# Patient Record
Sex: MALE | Race: WHITE | Employment: OTHER | URBAN - METROPOLITAN AREA
[De-identification: names, ages, dates, MRNs, and addresses within clinical notes are randomized per-mention and may not be internally consistent; named-entity substitution may affect disease eponyms.]

---

## 2019-12-19 ENCOUNTER — HOSPITAL ENCOUNTER (EMERGENCY)
Facility: CLINIC | Age: 64
Discharge: HOME | End: 2019-12-19
Attending: EMERGENCY MEDICINE | Admitting: EMERGENCY MEDICINE
Payer: COMMERCIAL

## 2019-12-19 VITALS
DIASTOLIC BLOOD PRESSURE: 82 MMHG | SYSTOLIC BLOOD PRESSURE: 147 MMHG | WEIGHT: 208.56 LBS | TEMPERATURE: 97.7 F | RESPIRATION RATE: 18 BRPM | OXYGEN SATURATION: 94 % | HEART RATE: 83 BPM

## 2019-12-19 DIAGNOSIS — I82.4Z2 ACUTE DEEP VEIN THROMBOSIS (DVT) OF DISTAL VEIN OF LEFT LOWER EXTREMITY (H): ICD-10-CM

## 2019-12-19 DIAGNOSIS — I45.10 RBBB: ICD-10-CM

## 2019-12-19 DIAGNOSIS — I26.99 BILATERAL PULMONARY EMBOLISM (H): ICD-10-CM

## 2019-12-19 LAB
ALBUMIN SERPL-MCNC: 3.9 G/DL (ref 3.4–5)
ALP SERPL-CCNC: 57 U/L (ref 40–150)
ALT SERPL W P-5'-P-CCNC: 36 U/L (ref 0–70)
AST SERPL W P-5'-P-CCNC: 23 U/L (ref 0–45)
BILIRUB DIRECT SERPL-MCNC: <0.1 MG/DL (ref 0–0.2)
BILIRUB SERPL-MCNC: 0.2 MG/DL (ref 0.2–1.3)
INTERPRETATION ECG - MUSE: NORMAL
NT-PROBNP SERPL-MCNC: 45 PG/ML (ref 0–900)
PROT SERPL-MCNC: 7.5 G/DL (ref 6.8–8.8)
TROPONIN I SERPL-MCNC: <0.015 UG/L (ref 0–0.04)

## 2019-12-19 PROCEDURE — 93005 ELECTROCARDIOGRAM TRACING: CPT

## 2019-12-19 PROCEDURE — 25000132 ZZH RX MED GY IP 250 OP 250 PS 637: Performed by: EMERGENCY MEDICINE

## 2019-12-19 PROCEDURE — 80076 HEPATIC FUNCTION PANEL: CPT | Performed by: EMERGENCY MEDICINE

## 2019-12-19 PROCEDURE — 99284 EMERGENCY DEPT VISIT MOD MDM: CPT

## 2019-12-19 PROCEDURE — 83880 ASSAY OF NATRIURETIC PEPTIDE: CPT | Performed by: EMERGENCY MEDICINE

## 2019-12-19 PROCEDURE — 84484 ASSAY OF TROPONIN QUANT: CPT | Performed by: EMERGENCY MEDICINE

## 2019-12-19 RX ADMIN — APIXABAN 10 MG: 5 TABLET, FILM COATED ORAL at 19:03

## 2019-12-19 ASSESSMENT — ENCOUNTER SYMPTOMS
SHORTNESS OF BREATH: 1
COUGH: 0
FEVER: 0
ABDOMINAL PAIN: 0
DIAPHORESIS: 0

## 2019-12-19 NOTE — ED PROVIDER NOTES
History     Chief Complaint:  Deep Vein Thrombosis and Pulmonary Embolism    HPI   Raffaele Sarmiento is a 64 year old male who presents with shortness of breath and leg swelling. The patient reports for the past two months, he has had left leg swelling with some associated upper left leg pain. He states he has had shortness of breath since last week when he was shoveling snow. He notes he became short of breath with some pain with respiration. He reports the pain with respiration resolved after his initial episode of shortness of breath, but his shortness of breath is still exacerbated with exertion. He reports going to his PCP, Dr. Sherif Doyle where he had a left lower extremity venous ultrasound and a PE chest CT today where he was shown to have acute pulmonary embolus and acute DVT.  He was told to come to the ED after these results returned. He denies chest pain, history of heart problems, history of lung problems, fevers, cough, abdominal pain, diaphoresis, central chest pain, increased shoulder pain, and jaw pain.     CARDIAC RISK FACTORS:  Sex:    Male  Tobacco:   Former, quit 1/1/97  Hypertension:   Yes  Hyperlipidemia:  Yes  Diabetes:   No  Family History:  No    PE/DVT RISK FACTORS:  Sex:    Male  Hormones:   No  Tobacco:   Former, quit 1/1/97  Cancer:   No  Travel:   No  Surgery:   No  Other immobilization: No  Personal history:  No  Family history:  No    12/19/19 CT Chest PE  1. Positive for acute pulmonary emboli within the distal right main pulmonary artery and extending into the segmental branches of the right upper, right middle, and right lower lobes, as well as within segmental branches of the left lower lobe. Findings discussed with Sherif Doyle at 4:05pm on 12/19/2019.  2. No definite evidence of right heart strain. No pulmonary infarct.  As read by Radiology.    12/19/19 US Venous Lower Extremity Left  Acute DVT within the left femoral, popliteal, and proximal posterior tibial veins. Findings  discussed with Sherif Doyle at 2:46 p.m. on 12/19/2019.  As read by Radiology.    12/19/19 Laboratory  CBC: WBC 8.5, HGB 14.7,   D-dimer: >4.00  Common chemistry: Creatinine 1.20 (H), GFR >60    Allergies:  No known drug allergies    Medications:    Zocor  Lisinopril-Hydrochlorothiazide    Past Medical History:    Hypertension  Hyperlipidemia  Glaucoma    Past Surgical History:    Tibia and ankle fracture surgery    Family History:    Pancreas cancer (Brother)  Leukemia (Father)  Hypertension (Mother)    Social History:  Smoking status: Former, quit 1/1/97  Alcohol use: No, quit 1990  PCP: [facility]  Marital Status:   [2]    Review of Systems   Constitutional: Negative for diaphoresis and fever.   Respiratory: Positive for shortness of breath. Negative for cough.    Cardiovascular: Positive for leg swelling. Negative for chest pain.   Gastrointestinal: Negative for abdominal pain.   All other systems reviewed and are negative.      Physical Exam     Patient Vitals for the past 24 hrs:   BP Temp Temp src Pulse Heart Rate Resp SpO2 Weight   12/19/19 1900 (!) 147/82 -- -- 83 -- -- 94 % --   12/19/19 1845 127/84 -- -- 77 -- -- 98 % --   12/19/19 1830 134/77 -- -- 73 -- -- 98 % --   12/19/19 1745 136/81 -- -- 76 -- -- 97 % --   12/19/19 1708 -- -- -- -- -- -- -- 94.6 kg (208 lb 8.9 oz)   12/19/19 1705 (!) 186/99 97.7  F (36.5  C) Temporal -- 77 18 98 % --     Physical Exam  General: Alert, no acute distress; nontoxic appearing  HEENT:  Moist mucous membranes. Conjunctiva normal.   CV:  RRR, no m/r/g, skin warm and well perfused; no JVD  Pulm:  CTAB, no wheezes/ronchi/rales.  No acute distress, breathing comfortably  GI:  Soft, nontender, nondistended.  No rebound or guarding.  Normal bowel sounds  MSK:  Moving all extremities.  No focal areas of edema, erythema, or tenderness  Skin:  WWP, no rashes, 2+ left lower extremity edema; skin color normal, no diaphoresis  Psych:  Well-appearing,  normal affect, regular speech    Emergency Department Course   ECG (17:27:49):  Rate 76 bpm. CT interval 140. QRS duration 124. QT/QTc 426/479. P-R-T axes 36 -22 35. Normal sinus rhythm with sinus arrhythmia. Right bundle branch block. Abnormal ECG. Interpreted at 1740 by Fermin Pascual MD.    Laboratory:  BNP: 45  Hepatic panel: WNL  Troponin I (Collected 1741): <0.015    Procedures:  None    Interventions:  1903: Eliquis 10 mg PO    Emergency Department Course:  Past medical records, nursing notes, and vitals reviewed.  1712: I performed an exam of the patient and obtained history, as documented above.  EKG performed, results above.    1820: I rechecked the patient. Explained findings to the patient.    1913: I rechecked the patient.  Findings and plan explained to the Patient. Patient discharged home with instructions regarding supportive care, medications, and reasons to return. The importance of close follow-up was reviewed.     Impression & Plan    Medical Decision Making:  Raffaele Sarmiento is a 64 year old male who presents to the ER with recently diagnosed bilateral PE and left DVT with imaging noted above.  He denies any chest pain or significant shortness of breath here in the ER.  He is not hypoxic and he is hemodynamically stable.  I reviewed the imaging studies obtained today and there is no evidence of right heart strain on imaging.  No signs of cerulea dolens.  EKG shows new RBBB, likely from this PE causing strain, but no acute ischemic changes.  Troponin, BNP normal.  No evidence of massive PE.  He ambulated in the ER without any shortness of breath or hypoxia. Discussed the risks and benefits of anticoagulation of Lovenox/warfarin vs DOAC with patient; patient elected for DOAC.  We will start him on Eliquis and have him continue this.  PESI score is 2 (scored based on age); with shared decision making based on PESI score, we decided that given the reassuring lab workup, hemodynamic stability,  no significant SOB limiting activity and no hypoxia, he is safe for discharge home with continued oral anticoagulation.  He felt comfortable with this plan.  I will have him follow up with his primary care doctor in 3 days.  I discussed with him reasons to return to the ER; patient left with all questions answered.    Diagnosis:    ICD-10-CM    1. Bilateral pulmonary embolism (H) I26.99 Hepatic panel     Hepatic panel     CANCELED: Hepatic panel   2. Acute deep vein thrombosis (DVT) of distal vein of left lower extremity (H) I82.4Z2    3. RBBB I45.10        Disposition:  discharged to home    Discharge Medications:   Details   apixaban ANTICOAGULANT (ELIQUIS STARTER PACK) 5 MG tablet Take 2 tablets (10 mg) by mouth 2 times daily for 7 days, THEN 1 tablet (5 mg) 2 times daily for 23 days., Disp-74 tablet, R-0, Local Print     Heath Keane  12/19/2019   [facility] EMERGENCY DEPARTMENT  I, Heath Keane, am serving as a scribe at 5:12 PM on 12/19/2019 to document services personally performed by Fermin Pascual MD based on my observations and the provider's statements to me.      Fermin Pascual MD  12/20/19 0013

## 2019-12-19 NOTE — ED TRIAGE NOTES
A&O x4, ABCs intact. Pt presents from his PCP for diagnosis of Blood clot in his left thigh and Pulmonary embolism. Pt denies chest pain. Pt states he has SOB.

## 2019-12-19 NOTE — ED AVS SNAPSHOT
[facility] Emergency Department  [address]  [facility] [address]  Phone:  [phone]  Fax:  [phone]                                    Raffaele Sarmiento   MRN: [id number]    Department:  [facility] Emergency Department   Date of Visit:  12/19/2019           After Visit Summary Signature Page    I have received my discharge instructions, and my questions have been answered. I have discussed any challenges I see with this plan with the nurse or doctor.    ..........................................................................................................................................  Patient/Patient Representative Signature      ..........................................................................................................................................  Patient Representative Print Name and Relationship to Patient    ..................................................               ................................................  Date                                   Time    ..........................................................................................................................................  Reviewed by Signature/Title    ...................................................              ..............................................  Date                                               Time          22EPIC Rev 08/18

## 2019-12-20 NOTE — DISCHARGE INSTRUCTIONS
Follow up with your primary care doctor in the next 3-5 days.    Return to the ER if you develop chest pain, worsening shortness of breath or any new concerning symptoms.

## 2022-01-19 ENCOUNTER — HOSPITAL ENCOUNTER (OUTPATIENT)
Dept: CARDIOLOGY | Facility: CLINIC | Age: 67
Discharge: HOME | End: 2022-01-19
Attending: FAMILY MEDICINE | Admitting: FAMILY MEDICINE
Payer: COMMERCIAL

## 2022-01-19 DIAGNOSIS — I10 PRIMARY HYPERTENSION: ICD-10-CM

## 2022-01-19 DIAGNOSIS — R60.0 PEDAL EDEMA: ICD-10-CM

## 2022-01-19 LAB — LVEF ECHO: NORMAL

## 2022-01-19 PROCEDURE — 93306 TTE W/DOPPLER COMPLETE: CPT | Mod: 26 | Performed by: INTERNAL MEDICINE

## 2022-01-19 PROCEDURE — 93306 TTE W/DOPPLER COMPLETE: CPT
